# Patient Record
(demographics unavailable — no encounter records)

---

## 2024-10-15 NOTE — HISTORY OF PRESENT ILLNESS
[FreeTextEntry1] : 68-year-old female with a hx of colon polyps and HIV. Last colonoscopy was 2021 and pt had 3 polyps. No GI symptoms and no FH of colon cancer.

## 2024-10-15 NOTE — ASSESSMENT
[FreeTextEntry1] : This is a 69 yo female w/ HIV, Hep C, and HTN who presents for f/up.  1) HIV: Last VL UD. Continue triumeq. Check  routine labs today.  Reviewed labs and imaging with patient. Pt could not tolerate Atripla in the past.   2) HCV: Pt with genotype 1a. VL >3 million. Fibrosure unable to be calculated. PT/INR was normal. Liver sonogram reviewed and was normal. CT w/o cirrhosis, but revealed lympadenopathy. MRI revealed cirrhosis. History of elevated AFP (which now normalized). Pt w/ APRI score of 1.128. Completed Harvoni 8/24/15. Hep C RNA UD consistent with Cure.  Pt will need twice a year abd US to evaluate for HCC.  Had 9/2024.  Cirrhosis noted but no focal lesion. Check Abd sono biyearly and AFP yearly. UTD 2024.   3) HCM: Received [id number] Tdap 2014 per patient. Due 2024. Pneumovax 2017. Prevnar 20 8/2022. Shingles vaccine - will get from pharmacy  Colonoscopy up to date - 8/2021. Due 8/2024.  Has GI f/up.  GYN UTD Dental - UTD Ophtho encouraged. Breast Sono/mammo up to date for 7/2023. Repeat 1 year. No evidence of malignancy. Rec MRI breast.  Will see if GYN ordered this. Bone density 2018 - +osteopenia. Start calcium 500 mg po bid and vit d 2000 units daily. Repeated 6/2019 and unchanged. UTD 5/2021. UTD 2024 due 5 years.   Prediabetic - encouraged dietary modification and exercise.  Vit D deficiency - was normal  4) HTN: Stable. Continue to monitor.  5) Hair loss - appreciate derm f/up.  6) COVID19 - serology negative. Up to date with vaccine. Had 4 doses of pfizer. Had bivalent booster   7) Neuro: Left wrist carpal tunnel. Right thigh numbness - told she had pinched nerve in right groin. Stable.  8) Tonsillith? - offered pt to see ENT.  9) Abnormal UA: - urine culture was negative.  Will monitor. No hematuria noted.   10) Referring to breast surgeon b/c has had implants long time for augmentation and would like to have them removed. Is overdue for f/up on this she informs me.  Mammogram rec breast MRI also.   RTC 4 months.

## 2024-10-15 NOTE — PHYSICAL EXAM
[Alert] : alert [Normal Voice/Communication] : normal voice/communication [Healthy Appearing] : healthy appearing [No Acute Distress] : no acute distress [Sclera] : the sclera and conjunctiva were normal [Hearing Threshold Finger Rub Not [place]] : hearing was normal [Normal Lips/Gums] : the lips and gums were normal [Oropharynx] : the oropharynx was normal [Normal Appearance] : the appearance of the neck was normal [No Neck Mass] : no neck mass was observed [No Respiratory Distress] : no respiratory distress [No Acc Muscle Use] : no accessory muscle use [Respiration, Rhythm And Depth] : normal respiratory rhythm and effort [Auscultation Breath Sounds / Voice Sounds] : lungs were clear to auscultation bilaterally [Heart Rate And Rhythm] : heart rate was normal and rhythm regular [Normal S1, S2] : normal S1 and S2 [Murmurs] : no murmurs [Bowel Sounds] : normal bowel sounds [No Masses] : no abdominal mass palpated [Abdomen Tenderness] : non-tender [Abdomen Soft] : soft [] : no hepatosplenomegaly [Oriented To Time, Place, And Person] : oriented to person, place, and time

## 2024-10-15 NOTE — REVIEW OF SYSTEMS
[As Noted in HPI] : as noted in HPI [Negative] : Heme/Lymph [___ # of Missed Doses in The Past Week] : [unfilled] doses missed in the past week  [Fever] : no fever [Feeling Sick] : not feeling sick [Normal Appetite] : appetite not normal  [Suicidal] : not suicidal [de-identified] : forgetting things

## 2024-10-15 NOTE — PHYSICAL EXAM
[General Appearance - Alert] : alert [General Appearance - In No Acute Distress] : in no acute distress [General Appearance - Well Nourished] : well nourished [Sclera] : the sclera and conjunctiva were normal [PERRL With Normal Accommodation] : pupils were equal in size, round, reactive to light [Outer Ear] : the ears and nose were normal in appearance [Neck Appearance] : the appearance of the neck was normal [Neck Cervical Mass (___cm)] : no neck mass was observed [Jugular Venous Distention Increased] : there was no jugular-venous distention [Exaggerated Use Of Accessory Muscles For Inspiration] : no accessory muscle use [Auscultation Breath Sounds / Voice Sounds] : lungs were clear to auscultation bilaterally [Heart Rate And Rhythm] : heart rate was normal and rhythm regular [Heart Sounds] : normal S1 and S2 [Heart Sounds Gallop] : no gallops [Murmurs] : no murmurs [Heart Sounds Pericardial Friction Rub] : no pericardial rub [Edema] : there was no peripheral edema [Bowel Sounds] : normal bowel sounds [Abdomen Soft] : soft [Abdomen Tenderness] : non-tender [] : no hepato-splenomegaly [Abdomen Mass (___ Cm)] : no abdominal mass palpated [Costovertebral Angle Tenderness] : no CVA tenderness [No Palpable Adenopathy] : no palpable adenopathy [Cervical Lymph Nodes Enlarged Posterior Bilaterally] : posterior cervical [Cervical Lymph Nodes Enlarged Anterior Bilaterally] : anterior cervical [Supraclavicular Lymph Nodes Enlarged Bilaterally] : supraclavicular [Musculoskeletal - Swelling] : no joint swelling [Nail Clubbing] : no clubbing  or cyanosis of the fingernails [Motor Tone] : muscle strength and tone were normal [Skin Color & Pigmentation] : normal skin color and pigmentation [No Focal Deficits] : no focal deficits [Oriented To Time, Place, And Person] : oriented to person, place, and time [Affect] : the affect was normal [FreeTextEntry1] : Rash on chest, urticaria like

## 2024-10-15 NOTE — HISTORY OF PRESENT ILLNESS
[Oral] : oral [Anal] : anal [Vaginal] : vaginal [Male ___] : [unfilled] male [FreeTextEntry1] : 68 F w/ HIV since 2002, hepatitis C, HTN, and environmental allergies who presents for f/up today.  HIV VL UD CD4 959, 37%.  Pt now taking Triumeq. Doing well.    Has not missed any doses of meds.  Hep C history: Pt completed treatment for Hep C on 8/24/15 with Harvoni.  12 week post treatment Hep C RNA was undetectable consistent with SVR.  Pt started Harvoni 6/1/15.  End date 8/24/15.  24 week Hep C RNA Undetectable 2/2016. Hepatitis C was never treated previously to this.  LFTs were slightly elevated.  Pt is s/p liver sonogram, which was normal.  Abdominal CT w/o cirrhosis, but did have abdominal lymphadenopathy.  MRI revealed cirrhosis, but negative for HCC.  Will need biyearly US and yearly AFP.  Abd US done 7/2022 - normal Abd US done 8/7/2023 - stable, normal  HCM:  Flu vaccine 10/15/24 Hep B  immune.   Pneumovax 8/2017. Prevnar 20 8/2022 Had prior colonoscopy in 2012, 3 polyps removed.  Repeated colonoscopy 5/2015.  Diverticulosis and polyps. Due 2020. UTD 8/2021. Due 8/2024. Saw GI this am.    mammo/US up to date  9/2024  abd US 8/2024 - UTD ophtho -dry eyes, should have yearly f/up  dental - UTD GYN UTD for 2024 bone density 9/2016 - +osteopenia.  Started calcium tablets and vitamin D. Repeated 6/2019 - still with osteopenia, unchanged. 5/2021 normal. 2024 normal  Of note, pt had elevated AFP which then normalized.  [id number] was negative.  CT chest done for wheezing and was normal.    2/20/24: ROS: Feels well today.   6/18/24: rash noted, itchy. Has had before on chest. 10/15/24: no new issues. Doing well. Doesn't miss ARVs, maybe 1 dose skipped. Still needs to see breast surgeon. ENT for tonsilliths.  [Sexually Active] : The patient is not sexually active [de-identified] : gonorrhea once 1980s\par  chlamydia [de-identified] : none [de-identified] : retired -  for [employer] [de-identified] : previous partner positive, no partners now [de-identified] : lives alone [de-identified] : family, siblings

## 2024-10-15 NOTE — ASSESSMENT
[FreeTextEntry1] : 68-year-old female with a hx of colon polyps and HIV. Last colonoscopy was 2021 and pt had 3 polyps. No GI symptoms and no FH of colon cancer.  Plan: colonoscopy.   I spent 20 min in the exam room with the pt.

## 2024-10-23 NOTE — HISTORY OF PRESENT ILLNESS
[FreeTextEntry1] : f/u  hairloss  [de-identified] : 67 year F w/ PMH of HIV (VLUD, CD4 902), HEP C s/p treatment w/ Harvoni, HTN, here for f/u of hair loss. Last seen 10/2023   # Biopsy proven CCCA.  - Previously was using doxy 20mg BID but stopped due to lack of efficacy/regrowth after 6 weeks. Notes doxy stopped itching/burning of scalp  - Uses clobetasol foam every other day - Denies itching/burning of scalp  - Taking 2.5mg minoxidil (started 12/2022), denies any side effects including lightheadedness, dizziness, leg swelling. Has started noticing hair on the cheeks/jawline since increasing dose and shaving hair - not bothering the patient. No issues with bp. - Has started noticing hair growing back on frontal scalp since increasing minoxidil dose in Feb 2023       History Hair loss x 1.5 yr, progressive on top of scalp and along frontal hair line. Also lateral eyebrow loss. notes scalp pruritus. Endorses prior hx of hot comb use and tight sara as a child. Her ID dr and PCP ok-ed starting oral minoxidil, patient would like to start that. Vit d 25-OH 76 08/2022 TSH wnl 08/2022

## 2024-10-23 NOTE — PHYSICAL EXAM
[FreeTextEntry3] : General: well appearing person in nad, alert, pleasant Skin: -band of scarring alopecia along frontal hair line with involvement of vertex scalp-on dermoscopy there is loss of follicular ostia, miniaturized hairs, honeycombing pattern at crown; compared to prior photos, some hair growth noted at temples, and on vertex- significant regrowth noted today, even in area of scarring -no perifollicular erythema or scale

## 2024-10-23 NOTE — ASSESSMENT
[FreeTextEntry1] : # CCCA, frontal and vertex of scalp; chronic, improved but not at treatment goal; Frontal hairline may also have component of androgenetic alopecia and chronic traction # High risk medication use - bx proven 10/11/2022 - discussed options which include ILK, topical steroid, doxycycline, topical minoxidil - s/p 6 weeks doxycycline 20mg BID- without regrowth, but with symptomatic resolution of previous itch/burning symptoms. can stop per preference - patient interested in oral minoxidil for treatment- her ID Dr. Dakota zuñiga with it and pcp Dr. Dakota zuñiga, recommended patient reach out to PCP to discuss if any blood pressure changes. - c/w minoxidil 2.5mg nightly, SED   including lightheaded, blood pressure changes, swelling in legs or trunk, hair growth on face or other unwanted areas, and others. If develops palpitations or any unlikely concerning symptoms, stop medication and seek medical attn. Discussed that as there are areas of scarring, unlikely hair will grow in those areas; patient understands.  - ok to stop clobetasol foam daily, SED. discussed if experiences recurrent itching/burning symptoms to restart - if interested in hair transplant, can consider Kareem Finn in [place], or other - will start metformin 10% soln to scalp 1-2x a day. SED including irritation. advised this is off label use and unclear how helpful it may be but some literature shows it can be helpful for scarring hairloss patients. pt is interested and understands there is OOP cost.    RTC 6 months or sooner prn ** nails not discussed today. hx of Longitudinal melanonychia, Left 4th fingernail-  reassess at next visit in ~6 months**

## 2024-11-08 NOTE — HISTORY OF PRESENT ILLNESS
[FreeTextEntry1] : RIKI AYOUB is a 68 year old F who presents for initial consultation for breast implant exchange.   Patient is s/p breast augmentation with silicone implants 1986 with a plastic surgeon in , does not have implant card, denies post-operative complication.  She had an enlarged lymph node to left breast, s/p LN bx 2001, path benign. Patient endorses left breast feels tight, firm, and right breast feels "leaky". s/p annual mammo/sono done 9/6/24, notable for "BIRADS 2, LEFT 1:00 11cm from the nipple 9mm x 6 x 9 mass previously described as lymph node which is enlarged compared to prior now demonstrating associated echogenic material likely silicone; RIGHT 9:00 12cm from the nipple echogenic mass again seen suggestive of silicone".  denies cp, sob, subjective fever, chills, headache, cough, myalgia, malaise, abd pain, n/v/d, decreased appetite, and generalized weakness.  Current bra size: prior to breast augmentation 32A, currently 36C Most recent mammogram: 09/2024 PMHx: colon polyps, HIV, HTN, HLD, pre-DM, vitamin D deficiency, b/l carpal tunnel syndrome, Hep C (completed treatment with Harvoni), cirrhosis, osteopenia, ectopic pregnancy PSHx: breast augmentation with silicone implants 1986 Family hx: denies hx of breast cancer; mother +cervical cancer, DM, HTN Allergies: NKDA, cat dander, dust mite, mold Current meds: amlodipine besy-benazepril, HCTZ, minoxidil, triumeq, valacyclovir, calcium tablets, vitamin D supplements Social hx: denies drug use, social ETOH use, former smoker, +marijuana, retired [employer] officer

## 2024-11-08 NOTE — DISCUSSION/SUMMARY
[TextEntry] : RIKI AYOUB is a 68 year old F who presents for initial consultation for breast implant exchange.   Exam findings discussed with patient, patient has capsular contracture L>R, and given silicone rupture on mammo/sono, recommend removal of bilateral implants. Of note, mammo/sono also notable for "enlarged mass in the left lateral breast now with increased echogenicity on sonography which may be consistent with lymph node with silicone". Recommend eval by surg/onc Dr. Marc for possible excision and if so, plan to coordinate for procedures to be done simultaneously.  Patient would like for replacement of implants, declined right breast mastopexy. Risks, benefits, and alternatives to surgery were reviewed and routine franyd-operative course described, which include but are not limited to infection, bleeding, recurrence, seroma, asymmetry, deformity, scarring, paresthesia, wound dehiscence, etc. Patient expressed understanding and wishes to proceed. Will proceed with surgical planning. Photos taken.   - Plan for Left breast capsulectomy, removal of bilateral ruptured implants, replacement with b/l silicone implants - Refer to surg/onc Dr. Marc - Our office will coordinate with patient - Surgical paperwork submitted

## 2024-11-08 NOTE — CONSULT LETTER
[Dear  ___] : Dear  [unfilled], [Consult Letter:] : I had the pleasure of evaluating your patient, [unfilled]. [Please see my note below.] : Please see my note below. [Consult Closing:] : Thank you very much for allowing me to participate in the care of this patient.  If you have any questions, please do not hesitate to contact me. [Sincerely,] : Sincerely, [( Thank you for referring [unfilled] for consultation for _____ )] : Thank you for referring [unfilled] for consultation for [unfilled] [FreeTextEntry3] : Max Padron MD

## 2024-11-08 NOTE — DISCUSSION/SUMMARY
[TextEntry] : RIKI AYOUB is a 68 year old F who presents for initial consultation for breast implant exchange.   Exam findings discussed with patient, patient has capsular contracture L>R, and given silicone rupture on mammo/sono, recommend removal of bilateral implants. Of note, mammo/sono also notable for "enlarged mass in the left lateral breast now with increased echogenicity on sonography which may be consistent with lymph node with silicone". Recommend eval by surg/onc Dr. Marc for possible excision and if so, plan to coordinate for procedures to be done simultaneously.  Patient would like for replacement of implants, declined right breast mastopexy. Risks, benefits, and alternatives to surgery were reviewed and routine frandy-operative course described, which include but are not limited to infection, bleeding, recurrence, seroma, asymmetry, deformity, scarring, paresthesia, wound dehiscence, etc. Patient expressed understanding and wishes to proceed. Will proceed with surgical planning. Photos taken.   - Plan for Left breast capsulectomy, removal of bilateral ruptured implants, replacement with b/l silicone implants - Refer to surg/onc Dr. Marc - Our office will coordinate with patient - Surgical paperwork submitted

## 2024-11-08 NOTE — PHYSICAL EXAM
[TextEntry] : Bilateral breasts: capsular contracture and snoopy deformity, L>R Left breast feels firm, mild tenderness on palpation Left axillary incision well healed, no hypertrophy Implants likely placed pre-pectoral Measurement in cm: Sternal Notch to Nipple: R - 29.5 L - 30 IMF to Nipple: R - 12 L - 9 Base Width: R - 15 L - 15  Constitutional: NAD, well-nourished HENT: Normocephalic, atraumatic, PERRL, non-icteric sclerae, neck supple, trachea midline PULM: LSCTAB, no wheezing or rhonchi CV: RRR, no murmur, rubs, or gallops Abd: Soft, NT, ND, +BS, no palpable masses or bulge MSK: DAILY, 5/5 strength to all extremities Skin: No rash noted Neuro: A&O x 3, no FND Psych: Mood is appropriate

## 2024-11-08 NOTE — HISTORY OF PRESENT ILLNESS
[FreeTextEntry1] : RIKI AYOUB is a 68 year old F who presents for initial consultation for breast implant exchange.   Patient is s/p breast augmentation with silicone implants 1986 with a plastic surgeon in , does not have implant card, denies post-operative complication.  She had an enlarged lymph node to left breast, s/p LN bx 2001, path benign. Patient endorses left breast feels tight, firm, and right breast feels "leaky". s/p annual mammo/sono done 9/6/24, notable for "BIRADS 2, LEFT 1:00 11cm from the nipple 9mm x 6 x 9 mass previously described as lymph node which is enlarged compared to prior now demonstrating associated echogenic material likely silicone; RIGHT 9:00 12cm from the nipple echogenic mass again seen suggestive of silicone".  denies cp, sob, subjective fever, chills, headache, cough, myalgia, malaise, abd pain, n/v/d, decreased appetite, and generalized weakness.  Current bra size: prior to breast augmentation 32A, currently 36C Most recent mammogram: 09/2024 PMHx: colon polyps, HIV, HTN, HLD, pre-DM, vitamin D deficiency, b/l carpal tunnel syndrome, Hep C (completed treatment with Harvoni), cirrhosis, osteopenia, ectopic pregnancy PSHx: breast augmentation with silicone implants 1986 Family hx: denies hx of breast cancer; mother +cervical cancer, DM, HTN Allergies: NKDA, cat dander, dust mite, mold Current meds: amlodipine besy-benazepril, HCTZ, minoxidil, triumeq, valacyclovir, calcium tablets, vitamin D supplements Social hx: denies drug use, social ETOH use, former smoker, +marijuana, retired [employer] officer

## 2024-11-08 NOTE — HISTORY OF PRESENT ILLNESS
[FreeTextEntry1] : RIKI AYOUB is a 68 year old F who presents for initial consultation for breast implant exchange.   Patient is s/p breast augmentation with silicone implants 1986 with a plastic surgeon in , does not have implant card, denies post-operative complication.  She had an enlarged lymph node to left breast, s/p LN bx 2001, path benign. Patient endorses left breast feels tight, firm, and right breast feels "leaky". s/p annual mammo/sono done 9/6/24, notable for "BIRADS 2, LEFT 1:00 11cm from the nipple 9mm x 6 x 9 mass previously described as lymph node which is enlarged compared to prior now demonstrating associated echogenic material likely silicone; RIGHT 9:00 12cm from the nipple echogenic mass again seen suggestive of silicone".  denies cp, sob, subjective fever, chills, headache, cough, myalgia, malaise, abd pain, n/v/d, decreased appetite, and generalized weakness.  Current bra size: prior to breast augmentation 32A, currently 36C Most recent mammogram: 09/2024 PMHx: colon polyps, HIV, HTN, HLD, pre-DM, vitamin D deficiency, b/l carpal tunnel syndrome, Hep C (completed treatment with Harvoni), cirrhosis, osteopenia, ectopic pregnancy PSHx: breast augmentation with silicone implants 1986 Family hx: denies hx of breast cancer; mother +cervical cancer, DM, HTN Allergies: NKDA, cat dander, dust mite, mold Current meds: amlodipine besy-benazepril, HCTZ, minoxidil, triumeq, valacyclovir, calcium tablets, vitamin D supplements Social hx: denies drug use, social ETOH use, former smoker, +marijuana, retired [employer] officer

## 2024-11-08 NOTE — DISCUSSION/SUMMARY
[TextEntry] : RIKI AYOUB is a 68 year old F who presents for initial consultation for breast implant exchange.   Exam findings discussed with patient, patient has capsular contracture L>R, and given silicone rupture on mammo/sono, recommend removal of bilateral implants. Of note, mammo/sono also notable for "enlarged mass in the left lateral breast now with increased echogenicity on sonography which may be consistent with lymph node with silicone". Recommend eval by surg/onc Dr. Marc for possible excision and if so, plan to coordinate for procedures to be done simultaneously.  Patient would like for replacement of implants, declined right breast mastopexy. Risks, benefits, and alternatives to surgery were reviewed and routine frandy-operative course described, which include but are not limited to infection, bleeding, recurrence, seroma, asymmetry, deformity, scarring, paresthesia, wound dehiscence, etc. Patient expressed understanding and wishes to proceed. Will proceed with surgical planning. Photos taken.   - Plan for Left breast capsulectomy, removal of bilateral ruptured implants, replacement with b/l silicone implants - Refer to surg/onc Dr. Marc - Our office will coordinate with patient - Surgical paperwork submitted rectal

## 2024-12-01 NOTE — REASON FOR VISIT
[Initial Consultation] : an initial consultation for [FreeTextEntry2] : Enlarged lymph node involving her LEFT BREAST (UOQ), Tyrer-Cuzick risk score: 4.0%..

## 2024-12-01 NOTE — ASSESSMENT
[FreeTextEntry1] : 68-year-old lady.  Referred with Enlarged lymph node involving her LEFT BREAST (UOQ).  This is an asymptomatic nonpalpable finding identified on annual breast imaging (@NR).  She has a history of bilateral breast augmentations, and the suggestion that the above finding may be a silicone laden lymph node.    Today with no worrisome findings on my physical examination.  Radiology has recommended an MRI, which I endorsed. Prescription entered.  I have asked her to call me a week after the MRI to discuss the results.  If no abnormalities, she will proceed with revision of her bilateral augmentations, with Dr. Padron, at a mutually convenient time for them.  Reviewed in detail, all questions answered.  Referring physician contacted through secure email.

## 2024-12-01 NOTE — REVIEW OF SYSTEMS
[Negative] : Endocrine [FreeTextEntry5] : HTN [FreeTextEntry7] : +HIV [FreeTextEntry8] : HEP C [FreeTextEntry1] : ADENOPATHY

## 2024-12-01 NOTE — PHYSICAL EXAM
[Normal] : supple, no neck mass and thyroid not enlarged [Normal Neck Lymph Nodes] : normal neck lymph nodes  [Normal Supraclavicular Lymph Nodes] : normal supraclavicular lymph nodes [Normal Axillary Lymph Nodes] : normal axillary lymph nodes [Normal] : normal appearance, no rash, nodules, vesicles, ulcers, erythema [de-identified] : Groins not examined [de-identified] : See diagram

## 2024-12-01 NOTE — HISTORY OF PRESENT ILLNESS
[de-identified] : 68-year-old lady.  Occupation: Retired the officer.  Tyrer-Cuzick risk score = 4.0%.  Referred by plastic surgery (Dr. Max GALLO).  Reason for visit: Enlarged lymph node involving her LEFT BREAST (UOQ).  Presently, no signs or symptoms related to either breast. She has been aware of a small lump in the upper outer quadrant of the augmented left breast since ~. It is grown (very slowly) over that time, now measuring ~1 cm on BSE.  On her recent breast imaging there is the asymptomatic palpable node. 2024: Bilateral mammogram and sonogram at 450: BI-RADS 2. 1.  Left breast (1:00, 11 cm FN): 9 x 6 x 9 mm nodule in the lateral left breast, increased in echogenicity, consistent with a lymph node containing silicone. 2.  Silicone granuloma/free silicone in the right breast, unchanged from previous exams.  Breast MRI recommended. Prescription entered today.  + Prior personal history: : Bilateral breast augmentation, silicone implants, she is not sure of their position the tip to the right pectoralis muscle.. The procedure was somewhat on [place], but no further details are available.  :   Needle biopsy of of the palpable nodule in the upper outer left breast, at H&P, was benign. No further intervention, or specific follow-up recommended by radiology.  No other procedures related to either breast.  No personal history of malignancy.   No relatives with breast cancer.  No family history of ovarian cancer.  Not Ashkenazi.  + Family history of malignancy: Mother: Cancer of the cervix.   Menarche at 14.  3, para 0. Natural menopause at 46. No HRT.   PMD: Dr. Vesta SCOTT.  NKDA.  + Hypertension. Controlled with amlodipine and HCTZ. She does not see a cardiologist.  She also takes daily minoxidil for alopecia. Sees dermatology: Dr. Baljinder GOLDSTEIN.  + HIV since . Treated with Triumeq. Infectious diseases: Dr. Joanna MCCARTHY.  2015: Treated with Harvoni for hepatitis C.  2023: Diagnosed with bilateral carpal tunnel syndrome on EMGs performed to evaluate bilateral hand numbness. PM&R: Dr. Dolores THIBODEAUX. Hand surgery: Dr. Kalani RUVALCABA.   2023: Neurology evaluation for tingling involving the right thigh. Saw neurology: Dr. Mickey SNOWDEN. Diagnosed with meralgia paresthetica. Conservative treatment instituted.   GYN: Dr. Jackie GARCIA. 2024 visit was unremarkable.   2024: Colonoscopy: Benign polypectomy. GI: Dr. Santiago ROJAS.

## 2024-12-01 NOTE — HISTORY OF PRESENT ILLNESS
[de-identified] : 68-year-old lady.  Occupation: Retired the officer.  Tyrer-Cuzick risk score = 4.0%.  Referred by plastic surgery (Dr. Max GALLO).  Reason for visit: Enlarged lymph node involving her LEFT BREAST (UOQ).  Presently, no signs or symptoms related to either breast. She has been aware of a small lump in the upper outer quadrant of the augmented left breast since ~. It is grown (very slowly) over that time, now measuring ~1 cm on BSE.  On her recent breast imaging there is the asymptomatic palpable node. 2024: Bilateral mammogram and sonogram at 450: BI-RADS 2. 1.  Left breast (1:00, 11 cm FN): 9 x 6 x 9 mm nodule in the lateral left breast, increased in echogenicity, consistent with a lymph node containing silicone. 2.  Silicone granuloma/free silicone in the right breast, unchanged from previous exams.  Breast MRI recommended. Prescription entered today.  + Prior personal history: : Bilateral breast augmentation, silicone implants, she is not sure of their position the tip to the right pectoralis muscle.. The procedure was somewhat on [place], but no further details are available.  :   Needle biopsy of of the palpable nodule in the upper outer left breast, at H&P, was benign. No further intervention, or specific follow-up recommended by radiology.  No other procedures related to either breast.  No personal history of malignancy.   No relatives with breast cancer.  No family history of ovarian cancer.  Not Ashkenazi.  + Family history of malignancy: Mother: Cancer of the cervix.   Menarche at 14.  3, para 0. Natural menopause at 46. No HRT.   PMD: Dr. Vesta SCOTT.  NKDA.  + Hypertension. Controlled with amlodipine and HCTZ. She does not see a cardiologist.  She also takes daily minoxidil for alopecia. Sees dermatology: Dr. Baljinder GOLDSTEIN.  + HIV since . Treated with Triumeq. Infectious diseases: Dr. Joanna MCCARTHY.  2015: Treated with Harvoni for hepatitis C.  2023: Diagnosed with bilateral carpal tunnel syndrome on EMGs performed to evaluate bilateral hand numbness. PM&R: Dr. Dolores THIBODEAUX. Hand surgery: Dr. Kalani RUVALCABA.   2023: Neurology evaluation for tingling involving the right thigh. Saw neurology: Dr. Mickey SNOWDEN. Diagnosed with meralgia paresthetica. Conservative treatment instituted.   GYN: Dr. Jackie GARCIA. 2024 visit was unremarkable.   2024: Colonoscopy: Benign polypectomy. GI: Dr. Santiago ROJAS.

## 2024-12-01 NOTE — PHYSICAL EXAM
[Normal] : supple, no neck mass and thyroid not enlarged [Normal Neck Lymph Nodes] : normal neck lymph nodes  [Normal Supraclavicular Lymph Nodes] : normal supraclavicular lymph nodes [Normal Axillary Lymph Nodes] : normal axillary lymph nodes [Normal] : normal appearance, no rash, nodules, vesicles, ulcers, erythema [de-identified] : Groins not examined [de-identified] : See diagram

## 2025-04-15 NOTE — HISTORY OF PRESENT ILLNESS
[FreeTextEntry1] : f/u  hairloss  [de-identified] : 67 year F w/ PMH of HIV (VLUD, CD4 902), HEP C s/p treatment w/ Harvoni, HTN, here for f/u of hair loss. Last seen 10/2023   # Biopsy proven CCCA.  - Previously was using doxy 20mg BID but stopped due to lack of efficacy/regrowth after 6 weeks. Notes doxy stopped itching/burning of scalp  - started metformin solution, itchy on vertex, otherwise denies burning - Taking 2.5mg minoxidil (started 12/2022), denies any side effects including lightheadedness, dizziness, leg swelling.No issues with bp. - Has started noticing hair growing back on frontal scalp since increasing minoxidil dose in Feb 2023  \ #Longitudinal melanonychia - patient with opaque polish today, reports it is resolved     History Hair loss x 1.5 yr, progressive on top of scalp and along frontal hair line. Also lateral eyebrow loss. notes scalp pruritus. Endorses prior hx of hot comb use and tight sara as a child. Her ID dr and PCP ok-ed starting oral minoxidil, patient would like to start that. Vit d 25-OH 76 08/2022 TSH wnl 08/2022

## 2025-04-15 NOTE — ASSESSMENT
[FreeTextEntry1] : # CCCA, frontal and vertex of scalp; chronic, improved but not at treatment goal; Frontal hairline may also have component of androgenetic alopecia and chronic traction # High risk medication use - bx proven 10/11/2022 - discussed options which include ILK, topical steroid, doxycycline, topical minoxidil - s/p 6 weeks doxycycline 20mg BID- without regrowth, but with symptomatic resolution of previous itch/burning symptoms. can stop per preference - patient interested in oral minoxidil for treatment- her ID Dr. Dakota zuñiga with it and pcp Dr. Dakota zuñiga, recommended patient reach out to PCP to discuss if any blood pressure changes. - c/w minoxidil 2.5mg nightly, SED   including lightheaded, blood pressure changes, swelling in legs or trunk, hair growth on face or other unwanted areas, and others. If develops palpitations or any unlikely concerning symptoms, stop medication and seek medical attn. Discussed that as there are areas of scarring, unlikely hair will grow in those areas; patient understands.  - can continue to hold clobetasol foam daily, SED. discussed if experiences recurrent itching/burning symptoms to restart - c/w metformin 10% soln to front band on scalp 1-2x a day. SED including irritation. advised this is off label use and unclear how helpful it may be but some literature shows it can be helpful for scarring hairloss patients. pt is interested and understands there is OOP cost.   # hx of Longitudinal melanonychia, Left 4th fingernail - opaque polish today, patient reports its clear, reassess at NV or counseled patient to take photos when polish removed RTC 6 months

## 2025-05-19 NOTE — HISTORY OF PRESENT ILLNESS
[Oral] : oral [Anal] : anal [Vaginal] : vaginal [Male ___] : [unfilled] male [FreeTextEntry1] : 68 F w/ HIV since 2002, hepatitis C, HTN, and environmental allergies who presents for f/up today.  HIV VL UD CD4 959, 37%.  Pt now taking Triumeq. Doing well.    Has not missed any doses of meds.  Hep C history: Pt completed treatment for Hep C on 8/24/15 with Harvoni.  12 week post treatment Hep C RNA was undetectable consistent with SVR.  Pt started Harvoni 6/1/15.  End date 8/24/15.  24 week Hep C RNA Undetectable 2/2016. Hepatitis C was never treated previously to this.  LFTs were slightly elevated.  Pt is s/p liver sonogram, which was normal.  Abdominal CT w/o cirrhosis, but did have abdominal lymphadenopathy.  MRI revealed cirrhosis, but negative for HCC.  Will need biyearly US and yearly AFP.  Abd US done 7/2022 - normal Abd US done 8/7/2023 - stable, normal  HCM:  Flu vaccine 10/15/24 Hep B  immune.   Pneumovax 8/2017. Prevnar 20 8/2022 Had prior colonoscopy in 2012, 3 polyps removed.  Repeated colonoscopy 5/2015.  Diverticulosis and polyps. Due 2020. UTD 8/2021. Due 8/2024. Saw GI this am.    mammo/US up to date  9/2024  abd US 8/2024 - UTD ophtho -dry eyes, should have yearly f/up  dental - UTD GYN UTD for 2024 bone density 9/2016 - +osteopenia.  Started calcium tablets and vitamin D. Repeated 6/2019 - still with osteopenia, unchanged. 5/2021 normal. 2024 normal  Of note, pt had elevated AFP which then normalized.  [id number] was negative.  CT chest done for wheezing and was normal.    2/20/24: ROS: Feels well today.   6/18/24: rash noted, itchy. Has had before on chest. 10/15/24: no new issues. Doing well. Doesn't miss ARVs, maybe 1 dose skipped. Still needs to see breast surgeon. ENT for tonsilliths.  2/11/2025: Doing well, takes ARVs. Doesn't miss doses. Working on removing breast implants. Due for Abd us for h/o cirrhosis 5/15/25: Doing well, no new complaints. Doesn't miss ARVs. Not going to remove breast implants, saw surgeon, no need.  To see hepatology.  [Sexually Active] : The patient is not sexually active [de-identified] : gonorrhea once 1980s\par  chlamydia [de-identified] : none [de-identified] : retired -  for [employer] [de-identified] : previous partner positive, no partners now [de-identified] : lives alone [de-identified] : family, siblings

## 2025-05-19 NOTE — ASSESSMENT
[FreeTextEntry1] : This is a 67 yo female w/ HIV, prior Hep C, and HTN who presents for f/up.  1) HIV: Last VL UD. Continue triumeq. Check  routine and annual labs today.  Reviewed labs and imaging with patient. Pt could not tolerate Atripla in the past.  2) HCV: Pt with genotype 1a. VL >3 million. Fibrosure unable to be calculated. PT/INR was normal. Liver sonogram reviewed and was normal. CT w/o cirrhosis, but revealed lymphadenopathy. MRI revealed cirrhosis. History of elevated AFP (which now normalized). Pt w/ APRI score of 1.128. Completed Harvoni 8/24/15. Hep C RNA UD consistent with Cure.  Pt will need twice a year abd US to evaluate for HCC.  Had 9/2024.  Cirrhosis noted but no focal lesion. Check Abd sono biyearly and AFP yearly. UTD 2024. Due now. RX given.  Referred to hepatology to ensure her f/up is complete and does not need any other screening.    3) HCM: Received [id number] Tdap 2014 per patient. Due 2024. Pneumovax 2017. Prevnar 20 8/2022. Shingles vaccine - will get from pharmacy  Colonoscopy up to date - 8/2021 and 11/2024.  Due1 year.  GYN UTD Dental - UTD Ophtho encouraged. Breast Sono/mammo up to date for 2024.   Bone density 2018 - +osteopenia. Start calcium 500 mg po bid and vit d 2000 units daily. Repeated 6/2019 and unchanged. UTD 5/2021. UTD 2024 was normal. Due 5 years.   Prediabetic - encouraged dietary modification and exercise.  Vit D deficiency - was normal  4) HTN: Stable. Continue to monitor.  5) Hair loss - appreciate derm f/up.  6) COVID19 - serology negative. Up to date with vaccine. Had 4 doses of pfizer. Had bivalent booster   7) Neuro: Left wrist carpal tunnel. Right thigh numbness - told she had pinched nerve in right groin. Stable.  8) Tonsillith? - offered pt to see ENT.  9) Abnormal UA: - urine culture was negative.  Will monitor. No hematuria noted.  Always has positive UA.   RTC 4 months.

## 2025-05-19 NOTE — REVIEW OF SYSTEMS
[As Noted in HPI] : as noted in HPI [Negative] : Heme/Lymph [___ # of Missed Doses in The Past Week] : [unfilled] doses missed in the past week  [Fever] : no fever [Feeling Sick] : not feeling sick [Normal Appetite] : appetite not normal  [Suicidal] : not suicidal [de-identified] : forgetting things

## 2025-05-19 NOTE — ASSESSMENT
[FreeTextEntry1] : This is a 67 yo female w/ HIV, prior Hep C, and HTN who presents for f/up.  1) HIV: Last VL UD. Continue triumeq. Check  routine and annual labs today.  Reviewed labs and imaging with patient. Pt could not tolerate Atripla in the past.  2) HCV: Pt with genotype 1a. VL >3 million. Fibrosure unable to be calculated. PT/INR was normal. Liver sonogram reviewed and was normal. CT w/o cirrhosis, but revealed lymphadenopathy. MRI revealed cirrhosis. History of elevated AFP (which now normalized). Pt w/ APRI score of 1.128. Completed Harvoni 8/24/15. Hep C RNA UD consistent with Cure.  Pt will need twice a year abd US to evaluate for HCC.  Had 9/2024.  Cirrhosis noted but no focal lesion. Check Abd sono biyearly and AFP yearly. UTD 2024. Due now. RX given.  Referred to hepatology to ensure her f/up is complete and does not need any other screening.    3) HCM: Received [id number]/15/24 Tdap 2014 per patient. Due 2024. Pneumovax 2017. Prevnar 20 8/2022. Shingles vaccine - will get from pharmacy  Colonoscopy up to date - 8/2021 and 11/2024.  Due1 year.  GYN UTD Dental - UTD Ophtho encouraged. Breast Sono/mammo up to date for 2024.   Bone density 2018 - +osteopenia. Start calcium 500 mg po bid and vit d 2000 units daily. Repeated 6/2019 and unchanged. UTD 5/2021. UTD 2024 was normal. Due 5 years.   Prediabetic - encouraged dietary modification and exercise.  Vit D deficiency - was normal  4) HTN: Stable. Continue to monitor.  5) Hair loss - appreciate derm f/up.  6) COVID19 - serology negative. Up to date with vaccine. Had 4 doses of pfizer. Had bivalent booster   7) Neuro: Left wrist carpal tunnel. Right thigh numbness - told she had pinched nerve in right groin. Stable.  8) Tonsillith? - offered pt to see ENT.  9) Abnormal UA: - urine culture was negative.  Will monitor. No hematuria noted.  Always has positive UA.   RTC 4 months.

## 2025-05-19 NOTE — HISTORY OF PRESENT ILLNESS
[Oral] : oral [Anal] : anal [Vaginal] : vaginal [Male ___] : [unfilled] male [FreeTextEntry1] : 68 F w/ HIV since 2002, hepatitis C, HTN, and environmental allergies who presents for f/up today.  HIV VL UD CD4 959, 37%.  Pt now taking Triumeq. Doing well.    Has not missed any doses of meds.  Hep C history: Pt completed treatment for Hep C on 8/24/15 with Harvoni.  12 week post treatment Hep C RNA was undetectable consistent with SVR.  Pt started Harvoni 6/1/15.  End date 8/24/15.  24 week Hep C RNA Undetectable 2/2016. Hepatitis C was never treated previously to this.  LFTs were slightly elevated.  Pt is s/p liver sonogram, which was normal.  Abdominal CT w/o cirrhosis, but did have abdominal lymphadenopathy.  MRI revealed cirrhosis, but negative for HCC.  Will need biyearly US and yearly AFP.  Abd US done 7/2022 - normal Abd US done 8/7/2023 - stable, normal  HCM:  Flu vaccine 10/15/24 Hep B  immune.   Pneumovax 8/2017. Prevnar 20 8/2022 Had prior colonoscopy in 2012, 3 polyps removed.  Repeated colonoscopy 5/2015.  Diverticulosis and polyps. Due 2020. UTD 8/2021. Due 8/2024. Saw GI this am.    mammo/US up to date  9/2024  abd US 8/2024 - UTD ophtho -dry eyes, should have yearly f/up  dental - UTD GYN UTD for 2024 bone density 9/2016 - +osteopenia.  Started calcium tablets and vitamin D. Repeated 6/2019 - still with osteopenia, unchanged. 5/2021 normal. 2024 normal  Of note, pt had elevated AFP which then normalized.  [id number] was negative.  CT chest done for wheezing and was normal.    2/20/24: ROS: Feels well today.   6/18/24: rash noted, itchy. Has had before on chest. 10/15/24: no new issues. Doing well. Doesn't miss ARVs, maybe 1 dose skipped. Still needs to see breast surgeon. ENT for tonsilliths.  2/11/2025: Doing well, takes ARVs. Doesn't miss doses. Working on removing breast implants. Due for Abd us for h/o cirrhosis 5/15/25: Doing well, no new complaints. Doesn't miss ARVs. Not going to remove breast implants, saw surgeon, no need.  To see hepatology.  [Sexually Active] : The patient is not sexually active [de-identified] : gonorrhea once 1980s\par  chlamydia [de-identified] : none [de-identified] : retired -  for [employer] [de-identified] : previous partner positive, no partners now [de-identified] : lives alone [de-identified] : family, siblings

## 2025-05-19 NOTE — REVIEW OF SYSTEMS
[As Noted in HPI] : as noted in HPI [Negative] : Heme/Lymph [___ # of Missed Doses in The Past Week] : [unfilled] doses missed in the past week  [Fever] : no fever [Feeling Sick] : not feeling sick [Normal Appetite] : appetite not normal  [Suicidal] : not suicidal [de-identified] : forgetting things

## 2025-06-24 NOTE — HISTORY OF PRESENT ILLNESS
[de-identified] : 67 yo woman w HIV, Htn, HLD, overwt, preDM, Hep C, cirrhosis on US  hair loss, allergies. Overall doing well. On oral minoxidil from Derm for hair loss. Elev lipids and ASCVD score 12%- has not agreed to statins thus far. Exercises and tries to eat healthy.  Breast bx Dr Marc this yr, all ok. Has implants/silicaone. Will be seeing Dr. Link, Hepatology soon for ?cirrhosis on US, h/o Hep C treated w Harvjanak. and [id number].  She states she's having gum issues and DDS told her it could be related to Amlodipine. Recent labs reviewed and her K+ is 3.3 she is also on HCTZ.  Colonoscopy 11/24 polys and poor prep, to return 1 yr Former smoker >15 yrs quit, had Lung CT in '17 neg Shingrix x 2 at pharm, Tdap is due. Optho uptodate 8/24 will go this summer.  Retired .

## 2025-06-24 NOTE — ASSESSMENT
[Vaccines Reviewed] : Immunizations reviewed today. Please see immunization details in the vaccine log within the immunization flowsheet.  [FreeTextEntry1] : L;abs reviewed- K+ 3.3 Lipids elev altho sl better than last yr, her ASCVD score is elevated. A1c 5.8 = same, preDM Creat sl elev from last yr   We dscussed starting a statin, she does not want to go on more meds, would like to try low fat diet and wt loss first- will get Cardiac CT-calcium score to add more info and she was amenable. K+ 3.3, will d/c HCTZ Gum issues from Amlod--will d/c Amlod Start Telmesartan 40 mg, this should help renal func and K+. Repeat labs 1 mo, add Lipo A and c-RP. Tdap at pharm.  f/u 1 mo

## 2025-06-24 NOTE — HEALTH RISK ASSESSMENT
[Good] : ~his/her~  mood as  good [No] : No [0] : 2) Feeling down, depressed, or hopeless: Not at all (0) [PHQ-2 Negative - No further assessment needed] : PHQ-2 Negative - No further assessment needed [Yes] : takes [Former] : Former [> 15 Years] : > 15 Years [Patient reported mammogram was normal] : Patient reported mammogram was normal [Patient reported PAP Smear was normal] : Patient reported PAP Smear was normal [Patient reported bone density results were normal] : Patient reported bone density results were normal [Patient reported colonoscopy was normal] : Patient reported colonoscopy was normal [HIV test declined] : HIV test declined [Hepatitis C test declined] : Hepatitis C test declined [Alone] : lives alone [Retired] : retired [Single] : single [Feels Safe at Home] : Feels safe at home [Fully functional (bathing, dressing, toileting, transferring, walking, feeding)] : Fully functional (bathing, dressing, toileting, transferring, walking, feeding) [Fully functional (using the telephone, shopping, preparing meals, housekeeping, doing laundry, using] : Fully functional and needs no help or supervision to perform IADLs (using the telephone, shopping, preparing meals, housekeeping, doing laundry, using transportation, managing medications and managing finances) [Smoke Detector] : smoke detector [Carbon Monoxide Detector] : carbon monoxide detector [Safety elements used in home] : safety elements used in home [Seat Belt] :  uses seat belt [de-identified] : walking, housework [de-identified] : healthy [[id number]] : 0 [Change in mental status noted] : No change in mental status noted [Language] : denies difficulty with language [High Risk Behavior] : no high risk behavior [Reports changes in hearing] : Reports no changes in hearing [Reports changes in vision] : Reports no changes in vision [Reports changes in dental health] : Reports no changes in dental health [MammogramDate] : 01/25 [PapSmearDate] : 08/24 [BoneDensityDate] : 09/24 [ColonoscopyDate] : 11/24 [ColonoscopyComments] : polyps, poor prep f/u 1 yr [HIVComments] : N/A [HepatitisCComments] : N/A [FreeTextEntry2] : retired

## 2025-07-24 NOTE — ASSESSMENT
[FreeTextEntry1] : Pt is stable. BP not at goal, will incr Telm to 80 mg. Check lipids, bmp, (hyokalemia), lipoA crp  Tdap at pharm  f/u 3 mo

## 2025-07-24 NOTE — HISTORY OF PRESENT ILLNESS
[FreeTextEntry1] : f/u [de-identified] : Pt d/cd Amlod and HCTZ, doing well on Telm 40 altho BP running a bit higher than usual. Working on diet/exercise. Cardiac CT =0 Did not have Tdap yet. Feels well.